# Patient Record
Sex: FEMALE | URBAN - METROPOLITAN AREA
[De-identification: names, ages, dates, MRNs, and addresses within clinical notes are randomized per-mention and may not be internally consistent; named-entity substitution may affect disease eponyms.]

---

## 2019-06-10 ENCOUNTER — OFFICE VISIT (OUTPATIENT)
Dept: URBAN - METROPOLITAN AREA CLINIC 88 | Facility: CLINIC | Age: 59
End: 2019-06-10
Payer: COMMERCIAL

## 2019-06-10 DIAGNOSIS — H53.021 REFRACTIVE AMBLYOPIA, RIGHT EYE: ICD-10-CM

## 2019-06-10 DIAGNOSIS — E11.9 TYPE 2 DIABETES MELLITUS WITHOUT COMPLICATIONS: Primary | ICD-10-CM

## 2019-06-10 DIAGNOSIS — H50.10 EXOTROPIA: ICD-10-CM

## 2019-06-10 DIAGNOSIS — H50.9 UNSPECIFIED STRABISMUS: ICD-10-CM

## 2019-06-10 DIAGNOSIS — H02.839 DERMATOCHALASIS OF UNSPECIFIED EYE, UNSPECIFIED EYELID: ICD-10-CM

## 2019-06-10 DIAGNOSIS — H25.813 COMBINED FORMS OF AGE-RELATED CATARACT, BILATERAL: ICD-10-CM

## 2019-06-10 PROCEDURE — 92004 COMPRE OPH EXAM NEW PT 1/>: CPT | Performed by: OPHTHALMOLOGY

## 2019-06-10 ASSESSMENT — KERATOMETRY
OD: 42.00
OS: 42.13

## 2019-06-10 ASSESSMENT — INTRAOCULAR PRESSURE
OD: 17
OS: 17

## 2019-06-10 ASSESSMENT — VISUAL ACUITY
OS: 20/25
OD: 20/200

## 2019-06-10 NOTE — IMPRESSION/PLAN
Impression: Unspecified strabismus: H50.9. Plan: Discussed diagnosis in detail with patient.  Will need a referral to [facility].

## 2019-06-10 NOTE — IMPRESSION/PLAN
Impression: Exotropia: H50.10. OD. Plan: Discussed diagnosis in detail with patient.  Continue to observe

## 2019-06-10 NOTE — IMPRESSION/PLAN
Impression: Type 2 diabetes mellitus without complications: N12.6. Condition: stable. Symptoms: will continue to monitor. Plan: Diabetes type II: no background retinopathy, no signs of neovascularization noted. Discussed ocular and systemic benefits of blood sugar control.

## 2019-06-10 NOTE — IMPRESSION/PLAN
Impression: Refractive amblyopia, right eye: H53.021. OD.  Plan: Longstanding condition since childhood

## 2019-06-10 NOTE — IMPRESSION/PLAN
Impression: Combined forms of age-related cataract, bilateral: H25.813. OU. Condition: stable. Symptoms: will continue to monitor. Plan: Discussed diagnosis in detail with patient. No treatment is required at this time. The patient will monitor vision changes and contact us with any decrease in vision.

## 2019-06-10 NOTE — IMPRESSION/PLAN
Impression: Dermatochalasis of unspecified eye, unspecified eyelid: H02.839. OU. Plan: Discussed diagnosis in detail with patient. No treatment is required at this time. Observe.